# Patient Record
Sex: FEMALE | Race: WHITE | Employment: UNEMPLOYED | URBAN - METROPOLITAN AREA
[De-identification: names, ages, dates, MRNs, and addresses within clinical notes are randomized per-mention and may not be internally consistent; named-entity substitution may affect disease eponyms.]

---

## 2017-07-24 PROCEDURE — 88175 CYTOPATH C/V AUTO FLUID REDO: CPT | Performed by: OBSTETRICS & GYNECOLOGY

## 2017-07-24 PROCEDURE — 87624 HPV HI-RISK TYP POOLED RSLT: CPT | Performed by: OBSTETRICS & GYNECOLOGY

## 2017-11-16 ENCOUNTER — HOSPITAL ENCOUNTER (OUTPATIENT)
Dept: MAMMOGRAPHY | Age: 47
Discharge: HOME | End: 2017-11-16
Attending: INTERNAL MEDICINE
Payer: COMMERCIAL

## 2017-11-16 DIAGNOSIS — Z12.39 ENCOUNTER FOR BREAST CANCER SCREENING OTHER THAN MAMMOGRAM: ICD-10-CM

## 2017-11-16 PROCEDURE — 77067 SCR MAMMO BI INCL CAD: CPT | Performed by: INTERNAL MEDICINE

## 2018-11-23 ENCOUNTER — HOSPITAL ENCOUNTER (OUTPATIENT)
Dept: MAMMOGRAPHY | Age: 48
Discharge: HOME | End: 2018-11-23
Attending: INTERNAL MEDICINE
Payer: COMMERCIAL

## 2018-11-23 DIAGNOSIS — R92.2 DENSE BREAST: ICD-10-CM

## 2018-11-23 DIAGNOSIS — Z12.39 SCREENING FOR BREAST CANCER: ICD-10-CM

## 2018-11-23 PROCEDURE — 77063 BREAST TOMOSYNTHESIS BI: CPT | Performed by: INTERNAL MEDICINE

## 2018-11-23 PROCEDURE — 77067 SCR MAMMO BI INCL CAD: CPT | Performed by: INTERNAL MEDICINE

## 2018-11-29 ENCOUNTER — HOSPITAL ENCOUNTER (OUTPATIENT)
Dept: MAMMOGRAPHY | Age: 48
Discharge: HOME | End: 2018-11-29
Attending: INTERNAL MEDICINE
Payer: COMMERCIAL

## 2018-11-29 ENCOUNTER — HOSPITAL ENCOUNTER (OUTPATIENT)
Dept: ULTRASOUND IMAGING | Age: 48
Discharge: HOME | End: 2018-11-29
Attending: INTERNAL MEDICINE
Payer: COMMERCIAL

## 2018-11-29 DIAGNOSIS — R92.2 INCONCLUSIVE MAMMOGRAM: ICD-10-CM

## 2018-11-29 PROCEDURE — 77065 DX MAMMO INCL CAD UNI: CPT | Performed by: INTERNAL MEDICINE

## 2018-11-29 PROCEDURE — 77061 BREAST TOMOSYNTHESIS UNI: CPT | Performed by: INTERNAL MEDICINE

## 2018-11-29 PROCEDURE — 76642 ULTRASOUND BREAST LIMITED: CPT | Performed by: INTERNAL MEDICINE

## 2019-11-30 ENCOUNTER — HOSPITAL ENCOUNTER (OUTPATIENT)
Dept: MAMMOGRAPHY | Age: 49
Discharge: HOME | End: 2019-11-30
Attending: INTERNAL MEDICINE
Payer: COMMERCIAL

## 2019-11-30 DIAGNOSIS — Z12.31 ENCOUNTER FOR SCREENING MAMMOGRAM FOR MALIGNANT NEOPLASM OF BREAST: ICD-10-CM

## 2019-11-30 PROCEDURE — 77063 BREAST TOMOSYNTHESIS BI: CPT | Performed by: INTERNAL MEDICINE

## 2019-11-30 PROCEDURE — 77067 SCR MAMMO BI INCL CAD: CPT | Performed by: INTERNAL MEDICINE

## 2020-12-01 ENCOUNTER — HOSPITAL ENCOUNTER (OUTPATIENT)
Dept: MAMMOGRAPHY | Age: 50
Discharge: HOME | End: 2020-12-01
Attending: INTERNAL MEDICINE
Payer: COMMERCIAL

## 2020-12-01 DIAGNOSIS — Z12.31 ENCOUNTER FOR SCREENING MAMMOGRAM FOR MALIGNANT NEOPLASM OF BREAST: ICD-10-CM

## 2020-12-01 PROCEDURE — 77067 SCR MAMMO BI INCL CAD: CPT | Performed by: INTERNAL MEDICINE

## 2020-12-01 PROCEDURE — 77063 BREAST TOMOSYNTHESIS BI: CPT | Performed by: INTERNAL MEDICINE

## 2021-07-11 RX ORDER — MELATONIN
1000 DAILY
COMMUNITY

## 2021-07-12 ENCOUNTER — LAB ENCOUNTER (OUTPATIENT)
Dept: LAB | Facility: HOSPITAL | Age: 51
End: 2021-07-12
Attending: INTERNAL MEDICINE
Payer: COMMERCIAL

## 2021-07-12 DIAGNOSIS — Z01.818 PRE-OP TESTING: ICD-10-CM

## 2021-07-13 LAB — SARS-COV-2 RNA RESP QL NAA+PROBE: NOT DETECTED

## 2021-07-15 ENCOUNTER — ANESTHESIA (OUTPATIENT)
Dept: ENDOSCOPY | Facility: HOSPITAL | Age: 51
End: 2021-07-15
Payer: COMMERCIAL

## 2021-07-15 ENCOUNTER — HOSPITAL ENCOUNTER (OUTPATIENT)
Facility: HOSPITAL | Age: 51
Setting detail: HOSPITAL OUTPATIENT SURGERY
Discharge: HOME | End: 2021-07-15
Attending: INTERNAL MEDICINE | Admitting: INTERNAL MEDICINE
Payer: COMMERCIAL

## 2021-07-15 ENCOUNTER — ANESTHESIA EVENT (OUTPATIENT)
Dept: ENDOSCOPY | Facility: HOSPITAL | Age: 51
End: 2021-07-15
Payer: COMMERCIAL

## 2021-07-15 VITALS
OXYGEN SATURATION: 95 % | BODY MASS INDEX: 33.66 KG/M2 | TEMPERATURE: 98 F | DIASTOLIC BLOOD PRESSURE: 69 MMHG | WEIGHT: 202 LBS | HEART RATE: 72 BPM | HEIGHT: 65 IN | RESPIRATION RATE: 16 BRPM | SYSTOLIC BLOOD PRESSURE: 116 MMHG

## 2021-07-15 DIAGNOSIS — R79.89 ELEVATED LFTS: ICD-10-CM

## 2021-07-15 DIAGNOSIS — Z01.818 PRE-OP TESTING: Primary | ICD-10-CM

## 2021-07-15 LAB
ALBUMIN SERPL-MCNC: 3.7 G/DL (ref 3.4–5)
ALP LIVER SERPL-CCNC: 174 U/L
ALT SERPL-CCNC: 126 U/L
AST SERPL-CCNC: 74 U/L (ref 15–37)
B-HCG UR QL: NEGATIVE
BILIRUB DIRECT SERPL-MCNC: 0.2 MG/DL (ref 0–0.2)
BILIRUB SERPL-MCNC: 0.7 MG/DL (ref 0.1–2)
M PROTEIN MFR SERPL ELPH: 7.2 G/DL (ref 6.4–8.2)

## 2021-07-15 PROCEDURE — BF45ZZZ ULTRASONOGRAPHY OF LIVER: ICD-10-PCS | Performed by: INTERNAL MEDICINE

## 2021-07-15 PROCEDURE — 88312 SPECIAL STAINS GROUP 1: CPT | Performed by: INTERNAL MEDICINE

## 2021-07-15 PROCEDURE — 36415 COLL VENOUS BLD VENIPUNCTURE: CPT | Performed by: INTERNAL MEDICINE

## 2021-07-15 PROCEDURE — 81025 URINE PREGNANCY TEST: CPT

## 2021-07-15 PROCEDURE — 88307 TISSUE EXAM BY PATHOLOGIST: CPT | Performed by: INTERNAL MEDICINE

## 2021-07-15 PROCEDURE — 80076 HEPATIC FUNCTION PANEL: CPT | Performed by: INTERNAL MEDICINE

## 2021-07-15 PROCEDURE — 0DB98ZX EXCISION OF DUODENUM, VIA NATURAL OR ARTIFICIAL OPENING ENDOSCOPIC, DIAGNOSTIC: ICD-10-PCS | Performed by: INTERNAL MEDICINE

## 2021-07-15 PROCEDURE — 0F924ZX DRAINAGE OF LEFT LOBE LIVER, PERCUTANEOUS ENDOSCOPIC APPROACH, DIAGNOSTIC: ICD-10-PCS | Performed by: INTERNAL MEDICINE

## 2021-07-15 PROCEDURE — 88313 SPECIAL STAINS GROUP 2: CPT | Performed by: INTERNAL MEDICINE

## 2021-07-15 PROCEDURE — 88305 TISSUE EXAM BY PATHOLOGIST: CPT | Performed by: INTERNAL MEDICINE

## 2021-07-15 RX ORDER — DEXAMETHASONE SODIUM PHOSPHATE 4 MG/ML
VIAL (ML) INJECTION AS NEEDED
Status: DISCONTINUED | OUTPATIENT
Start: 2021-07-15 | End: 2021-07-15 | Stop reason: SURG

## 2021-07-15 RX ORDER — SODIUM CHLORIDE, SODIUM LACTATE, POTASSIUM CHLORIDE, CALCIUM CHLORIDE 600; 310; 30; 20 MG/100ML; MG/100ML; MG/100ML; MG/100ML
INJECTION, SOLUTION INTRAVENOUS CONTINUOUS
Status: DISCONTINUED | OUTPATIENT
Start: 2021-07-15 | End: 2021-07-15

## 2021-07-15 RX ORDER — ONDANSETRON 2 MG/ML
INJECTION INTRAMUSCULAR; INTRAVENOUS AS NEEDED
Status: DISCONTINUED | OUTPATIENT
Start: 2021-07-15 | End: 2021-07-15 | Stop reason: SURG

## 2021-07-15 RX ORDER — MIDAZOLAM HYDROCHLORIDE 1 MG/ML
INJECTION INTRAMUSCULAR; INTRAVENOUS AS NEEDED
Status: DISCONTINUED | OUTPATIENT
Start: 2021-07-15 | End: 2021-07-15 | Stop reason: SURG

## 2021-07-15 RX ORDER — ONDANSETRON 2 MG/ML
4 INJECTION INTRAMUSCULAR; INTRAVENOUS ONCE AS NEEDED
Status: DISCONTINUED | OUTPATIENT
Start: 2021-07-15 | End: 2021-07-15 | Stop reason: HOSPADM

## 2021-07-15 RX ORDER — SODIUM CHLORIDE, SODIUM LACTATE, POTASSIUM CHLORIDE, CALCIUM CHLORIDE 600; 310; 30; 20 MG/100ML; MG/100ML; MG/100ML; MG/100ML
INJECTION, SOLUTION INTRAVENOUS CONTINUOUS
Status: DISCONTINUED | OUTPATIENT
Start: 2021-07-15 | End: 2021-07-15 | Stop reason: HOSPADM

## 2021-07-15 RX ORDER — NALOXONE HYDROCHLORIDE 0.4 MG/ML
80 INJECTION, SOLUTION INTRAMUSCULAR; INTRAVENOUS; SUBCUTANEOUS AS NEEDED
Status: DISCONTINUED | OUTPATIENT
Start: 2021-07-15 | End: 2021-07-15 | Stop reason: HOSPADM

## 2021-07-15 RX ORDER — LIDOCAINE HYDROCHLORIDE 10 MG/ML
INJECTION, SOLUTION EPIDURAL; INFILTRATION; INTRACAUDAL; PERINEURAL AS NEEDED
Status: DISCONTINUED | OUTPATIENT
Start: 2021-07-15 | End: 2021-07-15 | Stop reason: SURG

## 2021-07-15 RX ORDER — HYDROMORPHONE HYDROCHLORIDE 1 MG/ML
0.2 INJECTION, SOLUTION INTRAMUSCULAR; INTRAVENOUS; SUBCUTANEOUS EVERY 5 MIN PRN
Status: DISCONTINUED | OUTPATIENT
Start: 2021-07-15 | End: 2021-07-15 | Stop reason: HOSPADM

## 2021-07-15 RX ADMIN — ONDANSETRON 4 MG: 2 INJECTION INTRAMUSCULAR; INTRAVENOUS at 15:59:00

## 2021-07-15 RX ADMIN — DEXAMETHASONE SODIUM PHOSPHATE 8 MG: 4 MG/ML VIAL (ML) INJECTION at 15:59:00

## 2021-07-15 RX ADMIN — SODIUM CHLORIDE, SODIUM LACTATE, POTASSIUM CHLORIDE, CALCIUM CHLORIDE: 600; 310; 30; 20 INJECTION, SOLUTION INTRAVENOUS at 16:27:00

## 2021-07-15 RX ADMIN — LIDOCAINE HYDROCHLORIDE 50 MG: 10 INJECTION, SOLUTION EPIDURAL; INFILTRATION; INTRACAUDAL; PERINEURAL at 15:56:00

## 2021-07-15 RX ADMIN — MIDAZOLAM HYDROCHLORIDE 2 MG: 1 INJECTION INTRAMUSCULAR; INTRAVENOUS at 15:52:00

## 2021-07-15 RX ADMIN — SODIUM CHLORIDE, SODIUM LACTATE, POTASSIUM CHLORIDE, CALCIUM CHLORIDE: 600; 310; 30; 20 INJECTION, SOLUTION INTRAVENOUS at 15:50:00

## 2021-07-15 NOTE — ANESTHESIA PREPROCEDURE EVALUATION
Anesthesia PreOp Note    HPI:     Lexi Najera is a 46year old female who presents for preoperative consultation requested by: Kendy Ojeda MD    Date of Surgery: 7/15/2021    Procedure(s):  ENDOSCOPIC RETROGRADE CHOLANGIOPANCREATOGRAPHY (ERC surgery STENTS PANCREAS D/T PANCREATITIS   • TISSUE CULTURE, SKIN/BIOPSY     • TONSILLECTOMY         Vitamin D3, Cholecalciferol, 25 MCG (1000 UT) Oral Tab, Take 1,000 Units by mouth daily. , Disp: , Rfl:   VENTOLIN  (90 Base) MCG/ACT Inhalation Aero use: No      Sexual activity: Yes        Partners: Male        Birth control/protection: Vasectomy    Other Topics      Concerns:        Not on file    Social History Narrative      Not on file    Social Determinants of Health  Financial Resource Strain: rescue inhaler use  Cardiovascular - negative ROS and normal exam  Exercise tolerance: good    Rhythm: regular  Rate: normal    Neuro/Psych    (+)  anxiety/panic attacks,  depression,       GI/Hepatic/Renal    (+) GERD well controlled,     Endo/Other

## 2021-07-15 NOTE — H&P
[facility] Department of  Gastroenterology  Update Health History :       Ej Gómez  female   Maddy Peralta MD     [id number]  3/6/1970 Primary Care Physician  Kendrick Null        HPI :  Elevated liver enzymes with negative parenchy • Thyroid disease Maternal Grandmother    • Heart Disease Maternal Grandfather    • Thyroid disease Maternal Aunt       Social History    Tobacco Use      Smoking status: Former Smoker        Quit date:         Years since quittin.5      Smokel intact      Assessment :  As above    Plan:   Upper endoscopy, endoscopic ultrasound, liver biopsy, ERCP  The risks and benefits of the procedure were discussed in detail with the patient, alternatives and options were all discussed, all questions were ans

## 2021-07-15 NOTE — ANESTHESIA POSTPROCEDURE EVALUATION
Patient: Gilmar Khan    Procedure Summary     Date: 07/15/21 Room / Location: [address] [place] [address] ENDOSCOPY 01 / [address] [place] [address] ENDOSCOPY    Anesthesia Start: 0855 Anesthesia Stop: 5977    Procedures:       ESOPHAGOGASTRODUODENOSCOPY (EGD) (N/A )      ENDOSCOPIC ULTRASOUND

## 2021-07-15 NOTE — ANESTHESIA PROCEDURE NOTES
Airway  Date/Time: 7/15/2021 3:58 PM  Urgency: elective    Airway not difficult    General Information and Staff    Patient location during procedure: endo  Resident/CRNA: Thea Siegel CRNA  Performed: CRNA     Indications and Patient Condition  Indicati

## 2021-07-15 NOTE — OPERATIVE REPORT
OPERATIVE REPORT   PATIENT NAME: Seun Kirby  MRN: [id number]  DATE OF OPERATION: 7/15/2021  PREOPERATIVE DIAGNOSIS:   1. Elevation of the liver enzymes without obvious cause.   2. History of sphincter of Oddi dysfunction with dilated bile duct, abdomi stomach and duodenum was performed including retroflexion in the stomach to view the cardia and fundus. The endoscope was straightened and removed, and the procedure was completed. The patient tolerated the procedure well.  There were no immediate complicat papilla. No obvious stones, sludge, or thickening of the bile duct wall seen. No obvious infiltrative disease noted. The head of pancreas appeared unremarkable without obvious mass lesion. No chronic pancreatitis changes seen.   Given the normal-appeari

## 2021-07-19 NOTE — PROGRESS NOTES
7/19/2021  [address] [id number]    Dear Trav Lopez,       Here are the biopsy/pathology findings from your recent EGD (upper endoscopy):    1. Duodenum biopsy: a normal biopsy of the small intestine.  Negative for c

## 2021-08-17 RX ORDER — SODIUM CHLORIDE, SODIUM LACTATE, POTASSIUM CHLORIDE, CALCIUM CHLORIDE 600; 310; 30; 20 MG/100ML; MG/100ML; MG/100ML; MG/100ML
INJECTION, SOLUTION INTRAVENOUS CONTINUOUS
Status: CANCELLED | OUTPATIENT
Start: 2021-08-17

## 2021-08-20 ENCOUNTER — LAB ENCOUNTER (OUTPATIENT)
Dept: LAB | Facility: HOSPITAL | Age: 51
End: 2021-08-20
Attending: INTERNAL MEDICINE
Payer: COMMERCIAL

## 2021-08-20 DIAGNOSIS — Z12.11 COLON CANCER SCREENING: ICD-10-CM

## 2021-08-21 LAB — SARS-COV-2 RNA RESP QL NAA+PROBE: NOT DETECTED

## 2021-08-23 ENCOUNTER — ANESTHESIA (OUTPATIENT)
Dept: ENDOSCOPY | Facility: HOSPITAL | Age: 51
End: 2021-08-23
Payer: COMMERCIAL

## 2021-08-23 ENCOUNTER — HOSPITAL ENCOUNTER (OUTPATIENT)
Facility: HOSPITAL | Age: 51
Setting detail: HOSPITAL OUTPATIENT SURGERY
Discharge: HOME | End: 2021-08-23
Attending: INTERNAL MEDICINE | Admitting: INTERNAL MEDICINE
Payer: COMMERCIAL

## 2021-08-23 ENCOUNTER — ANESTHESIA EVENT (OUTPATIENT)
Dept: ENDOSCOPY | Facility: HOSPITAL | Age: 51
End: 2021-08-23
Payer: COMMERCIAL

## 2021-08-23 VITALS
OXYGEN SATURATION: 96 % | HEIGHT: 65 IN | RESPIRATION RATE: 20 BRPM | WEIGHT: 202 LBS | BODY MASS INDEX: 33.66 KG/M2 | TEMPERATURE: 98 F | HEART RATE: 61 BPM | SYSTOLIC BLOOD PRESSURE: 112 MMHG | DIASTOLIC BLOOD PRESSURE: 60 MMHG

## 2021-08-23 DIAGNOSIS — Z12.11 COLON CANCER SCREENING: Primary | ICD-10-CM

## 2021-08-23 PROCEDURE — 0DBL8ZX EXCISION OF TRANSVERSE COLON, VIA NATURAL OR ARTIFICIAL OPENING ENDOSCOPIC, DIAGNOSTIC: ICD-10-PCS | Performed by: INTERNAL MEDICINE

## 2021-08-23 PROCEDURE — 88305 TISSUE EXAM BY PATHOLOGIST: CPT | Performed by: INTERNAL MEDICINE

## 2021-08-23 RX ORDER — SODIUM CHLORIDE, SODIUM LACTATE, POTASSIUM CHLORIDE, CALCIUM CHLORIDE 600; 310; 30; 20 MG/100ML; MG/100ML; MG/100ML; MG/100ML
INJECTION, SOLUTION INTRAVENOUS CONTINUOUS
Status: DISCONTINUED | OUTPATIENT
Start: 2021-08-23 | End: 2021-08-23

## 2021-08-23 RX ORDER — LIDOCAINE HYDROCHLORIDE 10 MG/ML
INJECTION, SOLUTION EPIDURAL; INFILTRATION; INTRACAUDAL; PERINEURAL AS NEEDED
Status: DISCONTINUED | OUTPATIENT
Start: 2021-08-23 | End: 2021-08-23 | Stop reason: SURG

## 2021-08-23 RX ADMIN — LIDOCAINE HYDROCHLORIDE 50 MG: 10 INJECTION, SOLUTION EPIDURAL; INFILTRATION; INTRACAUDAL; PERINEURAL at 09:39:00

## 2021-08-23 NOTE — H&P
[address] [facility] Department of  Gastroenterology  Update Health History :       Yolande Shultz  female   Demetria Porras MD     [id number]  3/6/1970 Primary Care Physician  Sima Severance        HPI :  Screening for colorectal cancer, change in bow Smokeless tobacco: Never Used    Vaping Use      Vaping Use: Never used    Alcohol use: No    Drug use: No       ALLERGIES:     Levaquin [Levofloxa*    OTHER (SEE COMMENTS)    Comment:abd cramping,loose stools, color change of stools,             pt has IB agreed to proceed with procedure as scheduled.       Maddy Peralta MD

## 2021-08-23 NOTE — OPERATIVE REPORT
[id number]  Eliane Khan  3/6/1970  8/23/2021      Preoperative Diagnosis: Screening for Colorectal cancer, father with ulcerative colitis  Postoperative Diagnosis: Hemorrhoids, sigmoid diverticulosis and colon polyp  Procedure Performed: Colonosco unremarkable. 5 mm polyp was removed from the transverse colon with cold snare polypectomy. Sigmoid diverticulosis was seen.     At the anal verge the endoscope was retroverted and other than some small internal hemorrhoids, no other abnormalities were

## 2021-08-23 NOTE — ANESTHESIA POSTPROCEDURE EVALUATION
[facility] Patient Status:  Hospital Outpatient Surgery   Age/Gender 46year old female MRN [id number]   Location [address] [place] [address] Attending Kenneth Johnson MD   Hosp Day # 0 PCP Anthony Pacheco

## 2021-08-23 NOTE — ANESTHESIA PREPROCEDURE EVALUATION
PRE-OP EVALUATION    Patient Name: Flo Ventura    Admit Diagnosis: Colon cancer screening [Z12.11]    Pre-op Diagnosis: Colon cancer screening [Z12.11]    COLONOSCOPY    Anesthesia Procedure: COLONOSCOPY (N/A )    Surgeon(s) and Role:     Carmella Vasquez Neuro/Psych      (+) depression                              Past Surgical History:   Procedure Laterality Date   • BRAIN SURGERY  2018    benign meningioma   • CHOLECYSTECTOMY     • D & C     • ERCP - REFERRAL     • KENDRA BIOPSY STEREO NODULE 1 SITE LEFT (C

## 2021-08-25 NOTE — PROGRESS NOTES
Please place in recall for colonoscopy due in 7 years.  Thank you.    8/25/2021  Abi Almendarez  [address] [phone]    Dear Ashley Lo,     The  biopsy/pathology findings from your colonoscopy showed:    Colon polyp: a sessile se

## 2021-12-04 ENCOUNTER — HOSPITAL ENCOUNTER (OUTPATIENT)
Dept: MAMMOGRAPHY | Age: 51
Discharge: HOME | End: 2021-12-04
Attending: INTERNAL MEDICINE
Payer: COMMERCIAL

## 2021-12-04 DIAGNOSIS — Z12.31 VISIT FOR SCREENING MAMMOGRAM: ICD-10-CM

## 2021-12-04 PROCEDURE — 77067 SCR MAMMO BI INCL CAD: CPT | Performed by: INTERNAL MEDICINE

## 2021-12-04 PROCEDURE — 77063 BREAST TOMOSYNTHESIS BI: CPT | Performed by: INTERNAL MEDICINE

## 2021-12-20 ENCOUNTER — HOSPITAL ENCOUNTER (EMERGENCY)
Age: 51
Discharge: HOME | End: 2021-12-20
Attending: EMERGENCY MEDICINE
Payer: COMMERCIAL

## 2021-12-20 VITALS
HEIGHT: 64 IN | RESPIRATION RATE: 16 BRPM | OXYGEN SATURATION: 96 % | SYSTOLIC BLOOD PRESSURE: 144 MMHG | DIASTOLIC BLOOD PRESSURE: 71 MMHG | HEART RATE: 69 BPM | WEIGHT: 210 LBS | BODY MASS INDEX: 35.85 KG/M2 | TEMPERATURE: 99 F

## 2021-12-20 DIAGNOSIS — K85.90 ACUTE PANCREATITIS WITHOUT INFECTION OR NECROSIS, UNSPECIFIED PANCREATITIS TYPE: Primary | ICD-10-CM

## 2021-12-20 PROCEDURE — 80053 COMPREHEN METABOLIC PANEL: CPT | Performed by: EMERGENCY MEDICINE

## 2021-12-20 PROCEDURE — 85025 COMPLETE CBC W/AUTO DIFF WBC: CPT | Performed by: EMERGENCY MEDICINE

## 2021-12-20 PROCEDURE — 96374 THER/PROPH/DIAG INJ IV PUSH: CPT

## 2021-12-20 PROCEDURE — 96361 HYDRATE IV INFUSION ADD-ON: CPT

## 2021-12-20 PROCEDURE — 99284 EMERGENCY DEPT VISIT MOD MDM: CPT

## 2021-12-20 PROCEDURE — 96375 TX/PRO/DX INJ NEW DRUG ADDON: CPT

## 2021-12-20 PROCEDURE — 83690 ASSAY OF LIPASE: CPT | Performed by: EMERGENCY MEDICINE

## 2021-12-20 RX ORDER — HYDROMORPHONE HYDROCHLORIDE 1 MG/ML
0.5 INJECTION, SOLUTION INTRAMUSCULAR; INTRAVENOUS; SUBCUTANEOUS EVERY 30 MIN PRN
Status: DISCONTINUED | OUTPATIENT
Start: 2021-12-20 | End: 2021-12-20

## 2021-12-20 RX ORDER — ONDANSETRON 2 MG/ML
4 INJECTION INTRAMUSCULAR; INTRAVENOUS ONCE
Status: COMPLETED | OUTPATIENT
Start: 2021-12-20 | End: 2021-12-20

## 2021-12-20 RX ORDER — PRAVASTATIN SODIUM 20 MG
20 TABLET ORAL
COMMUNITY
Start: 2021-12-07 | End: 2021-12-24

## 2021-12-20 RX ORDER — TRAMADOL HYDROCHLORIDE 50 MG/1
TABLET ORAL EVERY 6 HOURS PRN
Qty: 20 TABLET | Refills: 0 | Status: SHIPPED | OUTPATIENT
Start: 2021-12-20 | End: 2021-12-22 | Stop reason: CLARIF

## 2021-12-20 RX ORDER — ONDANSETRON 4 MG/1
4 TABLET, ORALLY DISINTEGRATING ORAL EVERY 4 HOURS PRN
Qty: 10 TABLET | Refills: 0 | Status: SHIPPED | OUTPATIENT
Start: 2021-12-20 | End: 2021-12-22 | Stop reason: CLARIF

## 2021-12-20 NOTE — ED INITIAL ASSESSMENT (HPI)
PT to the ED for evaluation of epigastric pain that wraps around to her back. 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 Reports nausea, no vomiting.

## 2021-12-20 NOTE — ED PROVIDER NOTES
Patient Seen in: THE [facility] Emergency Department In [place]      History   Patient presents with:  Abdomen/Flank Pain  Epigastric Pain    Stated Complaint: Abdominal and epigastric pain that started last night    Subjective:   HPI    17-year-old female wi PANCREAS D/T PANCREATITIS   • TISSUE CULTURE, SKIN/BIOPSY     • TONSILLECTOMY                  Social History    Tobacco Use      Smoking status: Former Smoker        Years: 15.00        Quit date:         Years since quittin.9      Smokeless toba (*)     All other components within normal limits   CBC WITH DIFFERENTIAL WITH PLATELET    Narrative: The following orders were created for panel order CBC With Differential With Platelet.   Procedure                               Abnormality         St screened and evaluated during this visit. As a treating physician attending to the patient, I determined, within reasonable clinical confidence and prior to discharge, that an emergency medical condition was not or was no longer present.   There was no in

## 2021-12-22 ENCOUNTER — APPOINTMENT (OUTPATIENT)
Dept: CT IMAGING | Facility: HOSPITAL | Age: 51
End: 2021-12-22
Attending: EMERGENCY MEDICINE
Payer: COMMERCIAL

## 2021-12-22 ENCOUNTER — HOSPITAL ENCOUNTER (OUTPATIENT)
Facility: HOSPITAL | Age: 51
Setting detail: OBSERVATION
Discharge: HOME | End: 2021-12-24
Attending: EMERGENCY MEDICINE | Admitting: INTERNAL MEDICINE
Payer: COMMERCIAL

## 2021-12-22 DIAGNOSIS — K85.90 ACUTE PANCREATITIS, UNSPECIFIED COMPLICATION STATUS, UNSPECIFIED PANCREATITIS TYPE: Primary | ICD-10-CM

## 2021-12-22 PROBLEM — R73.9 HYPERGLYCEMIA: Status: ACTIVE | Noted: 2021-12-22

## 2021-12-22 PROBLEM — E87.1 HYPONATREMIA: Status: ACTIVE | Noted: 2021-12-22

## 2021-12-22 PROBLEM — D72.829 LEUKOCYTOSIS: Status: ACTIVE | Noted: 2021-12-22

## 2021-12-22 PROCEDURE — 74177 CT ABD & PELVIS W/CONTRAST: CPT | Performed by: EMERGENCY MEDICINE

## 2021-12-22 RX ORDER — SODIUM CHLORIDE, SODIUM LACTATE, POTASSIUM CHLORIDE, CALCIUM CHLORIDE 600; 310; 30; 20 MG/100ML; MG/100ML; MG/100ML; MG/100ML
INJECTION, SOLUTION INTRAVENOUS CONTINUOUS
Status: DISCONTINUED | OUTPATIENT
Start: 2021-12-22 | End: 2021-12-24

## 2021-12-22 RX ORDER — SERTRALINE HYDROCHLORIDE 100 MG/1
100 TABLET, FILM COATED ORAL DAILY
Status: DISCONTINUED | OUTPATIENT
Start: 2021-12-23 | End: 2021-12-24

## 2021-12-22 RX ORDER — ENOXAPARIN SODIUM 100 MG/ML
40 INJECTION SUBCUTANEOUS NIGHTLY
Status: DISCONTINUED | OUTPATIENT
Start: 2021-12-22 | End: 2021-12-24

## 2021-12-22 RX ORDER — HYDROCODONE BITARTRATE AND ACETAMINOPHEN 5; 325 MG/1; MG/1
2 TABLET ORAL EVERY 4 HOURS PRN
Status: DISCONTINUED | OUTPATIENT
Start: 2021-12-22 | End: 2021-12-24

## 2021-12-22 RX ORDER — HYDROCODONE BITARTRATE AND ACETAMINOPHEN 5; 325 MG/1; MG/1
1 TABLET ORAL EVERY 4 HOURS PRN
Status: DISCONTINUED | OUTPATIENT
Start: 2021-12-22 | End: 2021-12-24

## 2021-12-22 RX ORDER — MORPHINE SULFATE 2 MG/ML
2 INJECTION, SOLUTION INTRAMUSCULAR; INTRAVENOUS ONCE
Status: COMPLETED | OUTPATIENT
Start: 2021-12-22 | End: 2021-12-22

## 2021-12-22 RX ORDER — ONDANSETRON 2 MG/ML
4 INJECTION INTRAMUSCULAR; INTRAVENOUS EVERY 6 HOURS PRN
Status: DISCONTINUED | OUTPATIENT
Start: 2021-12-22 | End: 2021-12-24

## 2021-12-22 RX ORDER — ACETAMINOPHEN 325 MG/1
650 TABLET ORAL EVERY 4 HOURS PRN
Status: DISCONTINUED | OUTPATIENT
Start: 2021-12-22 | End: 2021-12-24

## 2021-12-22 RX ORDER — TRAMADOL HYDROCHLORIDE 50 MG/1
50 TABLET ORAL EVERY 6 HOURS PRN
COMMUNITY

## 2021-12-22 NOTE — ED PROVIDER NOTES
Patient Seen in: [facility] Emergency Department      History   Patient presents with:  Abdomen/Flank Pain    Stated Complaint: Sent by     Subjective:   HPI    Patient presents to the emergency department after being sent by her gastroenterolog Basal Cell Carcinoma removal from left upper arm   • OTHER SURGICAL HISTORY  2010    plantar fascia surgery   • OTHER SURGICAL HISTORY  2013    abdomen surgery STENTS PANCREAS D/T PANCREATITIS   • TISSUE CULTURE, SKIN/BIOPSY     • TONSILLECTOMY Neurological:      Mental Status: She is alert and oriented to person, place, and time.                ED Course     Labs Reviewed   BASIC METABOLIC PANEL (8) - Abnormal; Notable for the following components:       Result Value    Glucose 103 (*)     Sodi and uncinate process, which extends to involve the C-loop of the duodenum. 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 No CT evidence of pancreatic necrosis.   No well-de Yes    Leukocytosis D72.829 12/22/2021 Yes

## 2021-12-22 NOTE — ED INITIAL ASSESSMENT (HPI)
Patient complains of abd pain for a few days, was seen at [facility] and show to have high lipase, was not scanned, states she has had pancreatitis before, states she is here for a ct scan per her doctor

## 2021-12-23 NOTE — H&P
[facility] Emergency Department Hospitalist H&P       CC: Abdominal pain, abnormal labs    PCP: Benita Razo    History of Present Illness:   46year old female with history of pancreatitis, GERD, fatty liver, Asthma, anxiety, depression, who presents to the hospit pt has IBS  Lactose                 UNKNOWN  Sulfa Antibiotics       HIVES     Home Medications:  Reviewed with patient and updated in epic    Soc Hx  Social History    Tobacco Use      Smoking status: Former Smoker        Years: 15.00        Quit d duodenitis. No CT evidence of pancreatic necrosis. No well-defined/drainable peripancreatic fluid collection. No free intraperitoneal air. 2. Cholecystectomy. Mild extrahepatic biliary ductal dilation, which may relate to cholecystectomy state.   Noneth meds today   DVT prophylaxis: lovenox daily   Code status: [facility] Hospitalist   Answering service [phone]

## 2021-12-23 NOTE — PLAN OF CARE
Problem: GASTROINTESTINAL - ADULT  Goal: Minimal or absence of nausea and vomiting  Description: INTERVENTIONS:  - Maintain adequate hydration with IV or PO as ordered and tolerated  - Nasogastric tube to low intermittent suction as ordered  - Evaluate e and empty bladder  - Monitor intake/output and perform bladder scan as needed  - Follow urinary retention protocol/standard of care  - Consider collaborating with pharmacy to review patient's medication profile  - Implement strategies to promote bladder em

## 2021-12-23 NOTE — ED NOTES
Orders for admission, patient is aware of plan and ready to go upstairs. Any questions, please call ED RN Snedy Stoll  at extension [phone].    Type of COVID test sent:Rapid-pending  COVID Suspicion level: Low    Titratable drug(s) infusing: n/a  Rate: n/a    LOC a
diffuse tenderness to the posterior paraspinal cervical region

## 2021-12-23 NOTE — PAYOR COMM NOTE
--------------  ADMISSION REVIEW     Payor: [address] #:  [id number]  Authorization Number: [id number]      Please only fax to [phone]             ED Provider Notes        History   Patient presents with:  Abdomen Left 2012    benign.    • OTHER SURGICAL HISTORY      Foot surgery to L foot-7/9/10   • OTHER SURGICAL HISTORY  10/06/2016    skin cancer removal  Basal Cell Carcinoma removal from left upper arm   • OTHER SURGICAL HISTORY  2010    plantar fascia surgery Notable for the following components:    AST 50 (*)     ALT 92 (*)     Alkaline Phosphatase 198 (*)     Bilirubin, Direct 0.4 (*)     All other components within normal limits   URINALYSIS WITH CULTURE REFLEX - Abnormal; Notable for the following component dominant follicle. 7. Large 8.4 x 6.1 cm intramuscular lipoma in the left gluteus medius/minimus complex. 8. Lesser incidental findings as above.    Dictated by (CST): Santana Miramontes MD on 12/22/2021 at 5:52 PM     Finalized by (CST): Santana Miramontes MD on sips  -start some LR at 150cc/hr  -lipase already downtrended from few days ago which is re-assuring  -likely resume some diet in AM if she is pain free  -repeat WBC tomorrow, could be high from pancreatitis, monitor for fevers or other focal symptoms   -G 30.0                Recent Labs   Lab 12/20/21  0221 12/22/21  0824 12/22/21  1727 12/23/21  0658   ALT 67* 76* 92* 94*   AST 34 59* 50* 67*   ALB 3.6 4.4 3.4 2.7*         MEDICATIONS ADMINISTERED IN LAST 1 DAY:  enoxaparin (LOVENOX) 40 MG/0.4ML injection

## 2021-12-23 NOTE — PLAN OF CARE
Pt admitted overnight via ED. Admission complete. Alert. Ambulatory with SBA. Voiding freely. Fluids running. Maintained NPO status. Denies nausea or pain. Will discharge to home once cleared medically.      Problem: Patient Centered Care  Goal: Patient pre adequate hydration with IV or PO as ordered and tolerated  - Evaluate effectiveness of GI medications  - Encourage mobilization and activity  - Obtain nutritional consult as needed  - Establish a toileting routine/schedule  - Consider collaborating with ph assistance with activity based on assessment  - Modify environment to reduce risk of injury  - Provide assistive devices as appropriate  - Consider OT/PT consult to assist with strengthening/mobility  - Encourage toileting schedule  Outcome: Progressing

## 2021-12-23 NOTE — ED QUICK NOTES
Rounding Completed    Plan of Care reviewed. Waiting for inpatient room cleaning. Patient aware of transport delay. Elimination needs assessed. Provided blanket. Bed is locked and in lowest position. Call light within reach.

## 2021-12-23 NOTE — CONSULTS
[facility]    Report of Consultation    Ian Moise Patient Status:  Inpatient    3/6/1970 MRN [id number]   Location [facility] 4W/SW/SE Attending Hira Camacho, DO   Hosp Day # 1 PCP Rubin Cabot     Date of Admission: on her side in the fetal position. Denies fever, chills, nausea, vomiting. Has not had alcohol in 10 years and never was a heavy drinker before that. No abdominal trauma history. No hypertriglyceridemia.   Her only new medication was pravastatin which w Grandmother    • Thyroid disease Maternal Grandmother    • Heart Disease Maternal Grandfather    • Thyroid disease Maternal Aunt        Social History  Patient Parents    Mary Boyd (Mother)    Other Topics            Concern    None on file    Social H weakness or numbness  PSYCHE: no depression or anxiety  HEMATOLOGIC: no easy bruising  ENDOCRINE: no temperature intolerance    Physical Exam:   Blood pressure 132/69, pulse 84, temperature 98.1 °F (36.7 °C), temperature source Oral, resp.  rate 16, height 2D+3D DIAGNOSTIC ADDL VWS LEFT (LWL=36084/90636), 11/29/2018, 8:16 AM.  MG ROHAN [facility] LISA 2D+3D SCREENING BILAT (CPT=77067/89474), 11/30/2019, 12:58 PM.  MG DEREK [facility] LISA 2D+3D SCREENING BILAT (CPT=77067/66574), 12/01/2020, 9:09 AM.  Cherylann Lombard patient's size. Iterative reconstruction technique for dose reduction was employed. Oral contrast was ingested. FINDINGS: LUNG BASES: The heart is normal in size. There is dependent subsegmental atelectasis bilaterally.  LIVER: Nonspecific low density of t seen in the abdomen or pelvis. CONCLUSION:  1. Diffuse inflammatory stranding surrounding the pancreatic head and uncinate process, which extends to involve the C-loop of the duodenum.   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 Jaz Ashraf MD [facility]    12/23/2021

## 2021-12-23 NOTE — PROGRESS NOTES
[facility] Hospitalist Progress Note     CC: Hospital Follow up    PCP: Scottie Lima       Assessment/Plan:   46year old female with history of pancreatitis, GERD, fatty liver, Asthma, anxiety, depression, who presents to the hospital for evalua 12.8 oz (92.9 kg)  12/22/21 1442 : 210 lb (95.3 kg)  12/20/21 0206 : 210 lb (95.3 kg)  08/23/21 0828 : 202 lb (91.6 kg)  08/17/21 1443 : 202 lb (91.6 kg)      /67 (BP Location: Right arm)   Pulse 80   Temp 98.5 °F (36.9 °C) (Oral)   Resp 18   Ht 5' 4 extrahepatic biliary ductal dilation, which may relate to cholecystectomy state. Nonetheless, request correlation with obstructive laboratory parameters. 3. Fatty liver.   Subtle 1.2 cm posterior right hepatic lobe lesion, which statistically relates to a

## 2021-12-24 VITALS
SYSTOLIC BLOOD PRESSURE: 135 MMHG | BODY MASS INDEX: 34.97 KG/M2 | WEIGHT: 204.81 LBS | OXYGEN SATURATION: 95 % | HEART RATE: 63 BPM | TEMPERATURE: 98 F | HEIGHT: 64 IN | DIASTOLIC BLOOD PRESSURE: 74 MMHG | RESPIRATION RATE: 18 BRPM

## 2021-12-24 NOTE — DISCHARGE SUMMARY
General Medicine Discharge Summary     Patient ID:  Vee Posada  46year old  3/6/1970    Admit date: 12/22/2021    Discharge date and time: 12/24/21    Attending Physician: Aixa Lopes DO General: no acute distress  Heart: RRR  Lungs: clear bilaterally  Abdomen: nontender  Extremities: no pedal edema   Neuro: no focal deficits    Total time coordinating care for discharge: Greater than 30 minutes    Feliberto Owen DO

## 2021-12-24 NOTE — PROGRESS NOTES
[facility]  Gastroenterology Progress Note    Gricelda Malcolm Patient Status:  Inpatient    3/6/1970 MRN [id number]   Location [facility] 4W/SW/SE Attending Lennox Mote, [address] [facility] Day # 2 PCP Lulú Brandon     Subjective:  Ramses lilly 5:52 PM     Finalized by (CST): Steven Lopez MD on 12/22/2021 at 6:01 PM            Problem list:  Patient Active Problem List:     Allergic rhinitis due to cat and CR     Plantar fasciitis     Primary osteoarthritis of left knee     Pes anserinus bursi

## 2021-12-27 NOTE — PAYOR COMM NOTE
Discharge Notification    Patient Name: Christiana Fish  Payor: [address] #: [id number]  Authorization Number: [id number]  Admit Date/Time: 12/22/2021 5:09 PM  Discharge Date/Time: 12/24/2021 1:52 PM

## 2022-12-13 ENCOUNTER — HOSPITAL ENCOUNTER (OUTPATIENT)
Dept: MAMMOGRAPHY | Age: 52
Discharge: HOME | End: 2022-12-13
Attending: INTERNAL MEDICINE
Payer: COMMERCIAL

## 2022-12-13 DIAGNOSIS — Z12.31 ENCOUNTER FOR SCREENING MAMMOGRAM FOR BREAST CANCER: ICD-10-CM

## 2022-12-13 PROCEDURE — 77067 SCR MAMMO BI INCL CAD: CPT | Performed by: INTERNAL MEDICINE

## 2022-12-13 PROCEDURE — 77063 BREAST TOMOSYNTHESIS BI: CPT | Performed by: INTERNAL MEDICINE

## 2024-01-03 ENCOUNTER — HOSPITAL ENCOUNTER (OUTPATIENT)
Dept: MAMMOGRAPHY | Age: 54
Discharge: HOME | End: 2024-01-03
Attending: INTERNAL MEDICINE
Payer: COMMERCIAL

## 2024-01-03 DIAGNOSIS — Z12.31 ENCOUNTER FOR SCREENING MAMMOGRAM FOR MALIGNANT NEOPLASM OF BREAST: ICD-10-CM

## 2024-01-03 PROCEDURE — 77067 SCR MAMMO BI INCL CAD: CPT | Performed by: INTERNAL MEDICINE

## 2024-01-03 PROCEDURE — 77063 BREAST TOMOSYNTHESIS BI: CPT | Performed by: INTERNAL MEDICINE

## 2025-01-10 ENCOUNTER — HOSPITAL ENCOUNTER (OUTPATIENT)
Dept: MAMMOGRAPHY | Age: 55
Discharge: HOME | End: 2025-01-10
Attending: INTERNAL MEDICINE
Payer: COMMERCIAL

## 2025-01-10 DIAGNOSIS — Z12.31 VISIT FOR SCREENING MAMMOGRAM: ICD-10-CM

## 2025-01-10 PROCEDURE — 77067 SCR MAMMO BI INCL CAD: CPT | Performed by: INTERNAL MEDICINE

## 2025-01-10 PROCEDURE — 77063 BREAST TOMOSYNTHESIS BI: CPT | Performed by: INTERNAL MEDICINE

## 2025-04-11 ENCOUNTER — OFFICE VISIT (OUTPATIENT)
Dept: OTHER | Facility: HOSPITAL | Age: 55
End: 2025-04-11
Attending: PREVENTIVE MEDICINE

## 2025-04-11 DIAGNOSIS — Z11.1 SCREENING-PULMONARY TB: Primary | ICD-10-CM

## 2025-04-11 DIAGNOSIS — Z01.84 IMMUNITY STATUS TESTING: ICD-10-CM

## 2025-04-11 LAB
RUBV IGG SER QL: POSITIVE
RUBV IGG SER-ACNC: 35 IU/ML (ref 10–?)

## 2025-04-11 PROCEDURE — 86787 VARICELLA-ZOSTER ANTIBODY: CPT | Performed by: PREVENTIVE MEDICINE

## 2025-04-11 PROCEDURE — 86735 MUMPS ANTIBODY: CPT | Performed by: PREVENTIVE MEDICINE

## 2025-04-11 PROCEDURE — 86480 TB TEST CELL IMMUN MEASURE: CPT | Performed by: PREVENTIVE MEDICINE

## 2025-04-11 PROCEDURE — 86765 RUBEOLA ANTIBODY: CPT | Performed by: PREVENTIVE MEDICINE

## 2025-04-11 PROCEDURE — 86762 RUBELLA ANTIBODY: CPT | Performed by: PREVENTIVE MEDICINE

## 2025-04-14 LAB
M TB IFN-G CD4+ T-CELLS BLD-ACNC: 0.02 IU/ML
M TB TUBERC IFN-G BLD QL: NEGATIVE
M TB TUBERC IGNF/MITOGEN IGNF CONTROL: 1.95 IU/ML
MEV IGG SER-ACNC: 19.5 AU/ML (ref 16.5–?)
MUV IGG SER IA-ACNC: 135 AU/ML (ref 11–?)
QFT TB1 AG MINUS NIL: 0 IU/ML
QFT TB2 AG MINUS NIL: 0 IU/ML
VZV IGG SER IA-ACNC: 29.1 (ref 1–?)

## (undated) DEVICE — SNARE 9MM 230CM 2.4MM EXACTO

## (undated) DEVICE — MEDI-VAC NON-CONDUCTIVE SUCTION TUBING 6MM X 1.8M (6FT.) L: Brand: CARDINAL HEALTH

## (undated) DEVICE — 3M™ RED DOT™ MONITORING ELECTRODE WITH FOAM TAPE AND STICKY GEL, 50/BAG, 20/CASE, 72/PLT 2570: Brand: RED DOT™

## (undated) DEVICE — CANNULA NASAL 02/C02 ADULT

## (undated) DEVICE — CONMED SCOPE SAVER BITE BLOCK, 20X27 MM: Brand: SCOPE SAVER

## (undated) DEVICE — ENDOSCOPY PACK - LOWER: Brand: MEDLINE INDUSTRIES, INC.

## (undated) DEVICE — LINE MNTR ADLT SET O2 INTMD

## (undated) DEVICE — Device: Brand: DEFENDO AIR/WATER/SUCTION AND BIOPSY VALVE

## (undated) DEVICE — FILTERLINE NASAL ADULT O2/CO2

## (undated) DEVICE — ENDOSCOPIC ULTRASOUND FINE NEEDLE BIOPSY (FNB) DEVICE: Brand: ACQUIRE

## (undated) DEVICE — FORCEP RADIAL JAW 4

## (undated) DEVICE — TRAP 4 CPTR CHMBR N EZ INLN

## (undated) DEVICE — CAP SEALING, REVEAL 15.0MM

## (undated) DEVICE — MEDI-VAC NON-CONDUCTIVE SUCTION TUBING: Brand: CARDINAL HEALTH

## (undated) DEVICE — YANKAUER SUCTION INSTRUMENT NO CONTROL VENT, BULB TIP, CLEAR: Brand: YANKAUER

## (undated) DEVICE — 1200CC GUARDIAN II: Brand: GUARDIAN

## (undated) DEVICE — BALLOON HEMOSTATIC EUS LINEAR

## (undated) DEVICE — Device: Brand: CUSTOM PROCEDURE KIT

## (undated) NOTE — LETTER
Dotty Comes    Dr. Darren Madrigal MD        I acknowledge that I have been informed of the risk involved by refusing the urine pregnancy test on [facility].     I, ther